# Patient Record
Sex: FEMALE | Race: WHITE
[De-identification: names, ages, dates, MRNs, and addresses within clinical notes are randomized per-mention and may not be internally consistent; named-entity substitution may affect disease eponyms.]

---

## 2019-02-14 ENCOUNTER — HOSPITAL ENCOUNTER (EMERGENCY)
Dept: HOSPITAL 59 - ER | Age: 24
Discharge: HOME | End: 2019-02-14
Payer: SELF-PAY

## 2019-02-14 DIAGNOSIS — F17.210: ICD-10-CM

## 2019-02-14 DIAGNOSIS — L08.9: ICD-10-CM

## 2019-02-14 DIAGNOSIS — S60.572A: Primary | ICD-10-CM
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
## 2019-02-14 LAB
BASOPHILS NFR BLD: 0.3 % (ref 0–6)
EOSINOPHIL NFR BLD: 1.9 % (ref 0–6)
ERYTHROCYTE [DISTWIDTH] IN BLOOD BY AUTOMATED COUNT: 18 % (ref 11.5–14.5)
GRANULOCYTES NFR BLD: 67.2 % (ref 47–80)
HCT VFR BLD CALC: 43.4 % (ref 35–47)
HGB BLD-MCNC: 13.7 GM/DL (ref 11.6–16)
LYMPHOCYTES NFR BLD AUTO: 25 % (ref 16–45)
MCH RBC QN AUTO: 24.6 PG (ref 27–33)
MCHC RBC AUTO-ENTMCNC: 31.6 G/DL (ref 32–36)
MCV RBC AUTO: 78.2 FL (ref 81–97)
MONOCYTES NFR BLD: 5.6 % (ref 0–9)
PLATELET # BLD: 275 K/UL (ref 130–400)
PMV BLD AUTO: 11.4 FL (ref 7.4–10.4)
RBC # BLD AUTO: 5.55 M/UL (ref 3.8–5.4)
WBC # BLD AUTO: 6.8 K/UL (ref 4.2–12.2)

## 2019-02-14 PROCEDURE — 99284 EMERGENCY DEPT VISIT MOD MDM: CPT

## 2019-02-14 PROCEDURE — 85025 COMPLETE CBC W/AUTO DIFF WBC: CPT

## 2019-02-14 PROCEDURE — 96365 THER/PROPH/DIAG IV INF INIT: CPT

## 2019-02-14 NOTE — EMERGENCY DEPARTMENT RECORD
History of Present Illness





- General


Chief Complaint: Animal Bite


Stated Complaint: SWELLING IN HAND


Time Seen by Provider: 02/14/19 12:26


Mode of Arrival: Ambulatory





- History of Present Illness


Onset/Timing: 3


-: Days(s)


Animal: Dog


Description: Household pet


Mechanism: Bite


Associated Symptoms: Erythema





- Related Data


Patient Tetanus UTD (within 5 yrs): Yes


 Home Medications











 Medication  Instructions  Recorded  Confirmed  Last Taken


 


Levothyroxine Sodium [Synthroid] 125 mcg PO DAILY 02/14/19 02/14/19 02/13/19


 


Liothyronine Sodium [Cytomel] 5 mcg PO DAILY 02/14/19 02/14/19 02/13/19








 Previous Rx's











 Medication  Instructions  Recorded


 


Amoxicillin/Potassium Clav 1 tab PO TID 10 Days #30 tab 02/14/19





[Augmentin 500Mg/125Mg]  











 Allergies











Allergy/AdvReac Type Severity Reaction Status Date / Time


 


NO KNOWN DRUG ALLERGY Allergy   Uncoded 04/28/14 10:05














Travel Screening





- Travel/Exposure Within Last 30 Days


Have you traveled within the last 30 days?: No





- Travel/Exposure Within Last Year


Have you traveled outside the [place] in the last year?: No





- Additonal Travel Details


Have you been exposed to anyone with a communicable illness?: No





- Travel Symptoms


Symptom Screening: None





Past Medical History





- SOCIAL HISTORY


Smoking Status: Current every day smoker


Alcohol Use: None


Drug Use: Occasional


Drug Use Detail:: Marijuana





- RESPIRATORY


Hx Respiratory Disorders: No





- CARDIOVASCULAR


Hx Cardio Disorders: No





- NEURO


Hx Neuro Disorders: No





- GI


Hx GI Disorders: No





- 


Hx Genitourinary Disorders: No





- ENDOCRINE


Hx Endocrine Disorders: Yes


Hx Diabetes: No


Hx Thyroid Disease: Yes





- MUSCULOSKELETAL


Hx Musculoskeletal Disorders: No





- PSYCH


Hx Psych Problems: Yes


Hx Anxiety: Yes





- HEMATOLOGY/ONCOLOGY


Hx Hematology/Oncology Disorders: No





Family Medical History


Any Significant Family History?: No





Course





 Vital Signs











  02/14/19





  12:22


 


Pulse Rate 90


 


Respiratory 16





Rate 


 


Blood Pressure 122/78


 


Pulse Ox 99














- Reevaluation(s)


Reevaluation #1: 





02/14/19 13:23


XR neg.  Discussed with Dr. Goodwin.  Will see in office tomorrow.  





Medical Decision Making





- Management Options


MDM Management: Additional Work-up Planned (e.g. ADM/Transfer/OP Study)





- Data Complexity


MDM Data: Labs Ordered and/or Reviewed, X-Ray Ordered and/or Reviewed





- Lab Data


Result diagrams: 


 02/14/19 12:45








- Radiology Data


Radiology results: Image reviewed


-: Radiology Exam Interpreted by Myself





Disposition


Disposition: Discharge


Clinical Impression: 


 Dog bite of left hand, Dog bite of left hand with infection





Condition: (2) Stable


Instructions:  Animal Bite (ED)


Additional Instructions: 


To see Dr. Goodwin's office tomorrow without fail.  They will call you for 

appointment time.   





Prescriptions: 


Amoxicillin/Potassium Clav [Augmentin 500Mg/125Mg] 1 tab PO TID 10 Days #30 tab


Referrals: 


MAGDI GOODWIN M.D. [MEDICAL DOCTOR] - 


Time of Disposition: 13:19





Quality





- Quality Measures


Quality Measures: N/A





- Blood Pressure Screening


Does Patient Have Any of the Following: No


Blood Pressure Classification: Pre-Hypertensive BP Reading


Systolic Measurement: 122


Diastolic Measurement: 78


Screening for High Blood Pressure: < Pre-Hypertensive BP, F/U Documented > [

]


Pre-Hypertensive Follow-up Interventions: Follow-up with rescreen every year.

## 2019-02-15 NOTE — RADIOLOGY REPORT
DATE:  02/14/2019. 



EXAM:  LEFT HAND RADIOGRAPHS.



HISTORY:  Dog bite injury near fourth digit metacarpophalangeal joint.



TECHNIQUE:  Three views of the left hand.



FINDINGS:  No acute fracture seen.  No dislocation.  No radiopaque foreign 
bodies.



IMPRESSION:  

NO ACUTE OSSEOUS FINDINGS OR RADIOPAQUE FOREIGN BODIES.



JOB NUMBER:  [id number]
MTDD